# Patient Record
Sex: FEMALE | Race: WHITE | NOT HISPANIC OR LATINO | Employment: OTHER | URBAN - METROPOLITAN AREA
[De-identification: names, ages, dates, MRNs, and addresses within clinical notes are randomized per-mention and may not be internally consistent; named-entity substitution may affect disease eponyms.]

---

## 2017-05-08 ENCOUNTER — HOSPITAL ENCOUNTER (INPATIENT)
Facility: REHABILITATION | Age: 67
End: 2017-05-08
Attending: PHYSICAL MEDICINE & REHABILITATION | Admitting: PHYSICAL MEDICINE & REHABILITATION
Payer: MEDICARE

## 2019-12-21 ENCOUNTER — OFFICE VISIT (OUTPATIENT)
Dept: URGENT CARE | Facility: PHYSICIAN GROUP | Age: 69
End: 2019-12-21
Payer: MEDICARE

## 2019-12-21 ENCOUNTER — HOSPITAL ENCOUNTER (OUTPATIENT)
Dept: RADIOLOGY | Facility: MEDICAL CENTER | Age: 69
End: 2019-12-21
Attending: NURSE PRACTITIONER
Payer: MEDICARE

## 2019-12-21 VITALS
RESPIRATION RATE: 15 BRPM | DIASTOLIC BLOOD PRESSURE: 60 MMHG | TEMPERATURE: 97.8 F | BODY MASS INDEX: 27.31 KG/M2 | SYSTOLIC BLOOD PRESSURE: 102 MMHG | HEIGHT: 64 IN | OXYGEN SATURATION: 97 % | WEIGHT: 160 LBS | HEART RATE: 63 BPM

## 2019-12-21 DIAGNOSIS — R05.9 COUGH: ICD-10-CM

## 2019-12-21 DIAGNOSIS — R06.02 SOB (SHORTNESS OF BREATH): ICD-10-CM

## 2019-12-21 DIAGNOSIS — R09.1 PLEURISY: ICD-10-CM

## 2019-12-21 PROCEDURE — 99204 OFFICE O/P NEW MOD 45 MIN: CPT | Performed by: NURSE PRACTITIONER

## 2019-12-21 PROCEDURE — 71046 X-RAY EXAM CHEST 2 VIEWS: CPT

## 2019-12-21 ASSESSMENT — ENCOUNTER SYMPTOMS
FEVER: 0
COUGH: 1
NEUROLOGICAL NEGATIVE: 1
CARDIOVASCULAR NEGATIVE: 1
SHORTNESS OF BREATH: 1
CONSTITUTIONAL NEGATIVE: 1
CHILLS: 0
PALPITATIONS: 0

## 2019-12-22 NOTE — PROGRESS NOTES
Subjective:     Lilian Rod is a 69 y.o. female who presents for Cough (cold since thanksgiving, )       Cough   This is a new problem. The problem has been gradually worsening. Associated symptoms include shortness of breath. Pertinent negatives include no chills or fever.      Patient reports that about a week prior to Thanksgiving she developed a cough and cold.  Reports the cough has just started to improve.  However, reports concern of pain at her upper chest as well as her left middle back region.  Taking in a deep breath worsens the pain.  The pain improves immediately afterwards.  Prior therapy: NyQuil which helped.  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  She is concerned they may be related to that.  Reports that cold, dry air causes burning sensation in her lungs.  Has been boiling water on stove to try to help humidify the air in her home.    PMH:  has a past medical history of Anemia, Anesthesia, Arthritis, Backpain, Bronchitis, Colitis, Dental disorder, Fall, Heart burn, Hypertension, Hypothyroid, Indigestion, Infectious disease, Joint replacement, Pain (09-02-11), Pneumonia (1970's), Psychiatric problem, Renal disorder, Snoring, Unspecified hemorrhagic conditions, and Unspecified urinary incontinence.    MEDS:   Current Outpatient Medications:   •  hydrocodone/acetaminophen (NORCO)  MG TABS, Take 0.5-1 Tabs by mouth every four hours as needed ((Pain Scale 4-6))., Disp: 60 Tab, Rfl: 0  •  hydrocodone/acetaminophen (NORCO)  MG TABS, Take 1 Tab by mouth every 6 hours as needed for Mild Pain., Disp: 25 Each, Rfl: 0  •  amitriptyline (ELAVIL) 10 MG TABS, Take 40 mg by mouth every evening., Disp: , Rfl:   •  amlodipine (NORVASC) 5 MG TABS, Take 5 mg by mouth every day., Disp: , Rfl:   •  celecoxib (CELEBREX) 200 MG CAPS, Take 200 mg by mouth 2 times a day., Disp: , Rfl:   •  duloxetine (CYMBALTA) 60 MG CPEP, Take 60 mg by mouth every evening., Disp: , Rfl:   •   ferrous gluconate (FERGON) 324 (38 FE) MG TABS, Take 324 mg by mouth 2 times a day, with meals., Disp: , Rfl:   •  levothyroxine (SYNTHROID) 100 MCG TABS, Take 88 mcg by mouth every morning., Disp: , Rfl:   •  lisinopril (PRINIVIL) 20 MG TABS, Take 40 mg by mouth every morning., Disp: , Rfl:   •  hydrocodone-acetaminophen (NORCO) 5-325 MG TABS per tablet, Take 1-2 Tabs by mouth every 6 hours as needed., Disp: , Rfl:   •  diazepam (VALIUM) 5 MG TABS, Take 5-10 mg by mouth at bedtime as needed., Disp: , Rfl:   •  calcium carbonate (TUMS) 500 MG CHEW, Take 1 Tab by mouth 2 times a day., Disp: , Rfl:   •  hydroxyzine (VISTARIL) 50 MG CAPS, Take 1 Cap by mouth 3 times a day as needed for Itching (nausea)., Disp: 25 Cap, Rfl: 0  •  ascorbic acid (ASCORBIC ACID) 500 MG TABS, Take 500 mg by mouth every day. Take with meals, Disp: , Rfl:     ALLERGIES:   Allergies   Allergen Reactions   • Sulfa Drugs Itching     itch   • Tape      Rips skin off   • Zofran Itching   • Morphine      Unsure of reaction     SURGHX:   Past Surgical History:   Procedure Laterality Date   • ANKLE ORIF  11/8/2013    Performed by Burak Lujan M.D. at SUR[facility]ERY [facility]   • LUMBAR FUSION POSTERIOR  9/8/2011    Performed by OSMAN HOLLEY at SUR[facility]ERY [facility]   • LUMBAR DECOMPRESSION  9/8/2011    Performed by OSMAN HOLLEY at [facility]   • CERVICAL DISK AND FUSION ANTERIOR  6/2/2011    Performed by OSMAN HOLLEY at [facility]   • HARDWARE REMOVAL NEURO  6/2/2011    Performed by OSMAN HOLLEY at [facility]   • CORPECTOMY  6/2/2011    Performed by OSMAN HOLLEY at [facility]   • KNEE ARTHROPLASTY TOTAL  6/5/08    Performed by NIYAH FAULKNER at [facility]   • OTHER ORTHOPEDIC SURGERY  2007    right thumb and carpal tunnel   • OTHER NEUROLOGICAL SURG  2000    neck   • OTHER  2000    left foot repair   • OTHER ORTHOPEDIC SURGERY  1990    carpal tunnel bilateral   •  "GYN SURGERY  1980    hysterectomy   • CHOLECYSTECTOMY     • KNEE ARTHROTOMY      right   • OTHER      5-6 back surgeries prior to 1991   • OTHER      right thumb   • OTHER      jaw   • OTHER NEUROLOGICAL SURG  1980s    L4-5     SOCHX:  reports that she quit smoking about 24 years ago. Her smoking use included cigarettes. She has a 25.00 pack-year smoking history. She has never used smokeless tobacco. She reports current alcohol use. She reports that she does not use drugs.     FH: Reviewed with patient, not pertinent to this visit.    Review of Systems   Constitutional: Negative.  Negative for chills, fever and malaise/fatigue.   HENT: Negative.    Respiratory: Positive for cough and shortness of breath.    Cardiovascular: Negative.  Negative for palpitations.   Neurological: Negative.    All other systems reviewed and are negative.    Objective:     /60   Pulse 63   Temp 36.6 °C (97.8 °F)   Resp 15   Ht 1.626 m (5' 4\")   Wt 72.6 kg (160 lb)   SpO2 97%   BMI 27.46 kg/m²     Physical Exam  Vitals signs reviewed.   Constitutional:       General: She is not in acute distress.     Appearance: She is well-developed. She is not ill-appearing, toxic-appearing or diaphoretic.   HENT:      Head: Normocephalic.      Right Ear: External ear normal.      Left Ear: External ear normal.      Nose: Nose normal.   Eyes:      Extraocular Movements: Extraocular movements intact.      Conjunctiva/sclera: Conjunctivae normal.   Neck:      Musculoskeletal: Normal range of motion.   Cardiovascular:      Rate and Rhythm: Normal rate and regular rhythm.      Pulses: Normal pulses.      Heart sounds: Normal heart sounds.   Pulmonary:      Effort: Pulmonary effort is normal. No respiratory distress.      Breath sounds: Normal breath sounds. No decreased breath sounds, wheezing, rhonchi or rales.   Abdominal:      General: Bowel sounds are normal.   Musculoskeletal: Normal range of motion.         General: No deformity.      " Thoracic back: Normal.   Skin:     General: Skin is warm and dry.      Capillary Refill: Capillary refill takes less than 2 seconds.      Coloration: Skin is not pale.      Findings: No erythema or rash.   Neurological:      Mental Status: She is alert and oriented to person, place, and time.      Sensory: No sensory deficit.      Coordination: Coordination normal.   Psychiatric:         Behavior: Behavior normal. Behavior is cooperative.       Chest x-ray:    Details     Reading Physician Reading Date Result Priority   Jami Tobar M.D. 12/21/2019       Narrative & Impression        12/21/2019 5:42 PM     HISTORY/REASON FOR EXAM:  Shortness of Breath     TECHNIQUE/EXAM DESCRIPTION AND NUMBER OF VIEWS:  Two views of the chest.     COMPARISON:  9/19/2011     FINDINGS:     The cardiac silhouette  and mediastinal contours are normal.     No discrete opacity, pleural fluid or pneumothorax.     There are postoperative changes in the thoracolumbar spine with spinal curvature. Suture anchors are in the right humeral head.     Multiple surgical clips project over the upper abdomen.     IMPRESSION:     No acute cardiopulmonary findings.             Last Resulted: 12/21/19  5:53 PM           Assessment/Plan:     1. SOB (shortness of breath)  - DX-CHEST-2 VIEWS; Future    2. Cough    3. Pleurisy    X-ray of chest ordered. Radiology report and images reviewed by myself. No acute cardiopulmonary process. Concur with findings.    Discussed close monitoring and supportive measures including increasing fluids and rest.  Continue with keeping area humidified.    Vital signs stable, afebrile, asymptomatic, no acute distress.    Warning signs reviewed. Strict return/ER precautions advised.    Patient advised to: Return for 1) Symptoms don't improve or worsen, or go to ER, 2) Follow up with primary care in 7-10 days.    Differential diagnosis, natural history, supportive care, and indications for immediate follow-up discussed.  All questions answered. Patient agrees with the plan of care.

## 2021-01-15 DIAGNOSIS — Z23 NEED FOR VACCINATION: ICD-10-CM

## 2023-03-13 ENCOUNTER — TELEPHONE (OUTPATIENT)
Dept: ONCOLOGY | Facility: MEDICAL CENTER | Age: 73
End: 2023-03-13
Payer: MEDICARE

## 2023-03-13 NOTE — TELEPHONE ENCOUNTER
Called [phone], unable to speak to rep, lvm to fax iron infusion orders if Dr. Ayala wants patient to receive this treatment, provided fax number and phone number for [facility].

## 2023-03-29 ENCOUNTER — TELEPHONE (OUTPATIENT)
Dept: ONCOLOGY | Facility: MEDICAL CENTER | Age: 73
End: 2023-03-29
Payer: MEDICARE

## 2023-03-29 NOTE — TELEPHONE ENCOUNTER
louisam for Dr. Melo; we received orders for iron dextran, this drug is on a nationwide shortage we have none in stock. We have an alternative drug called Feraheme that we could use if we received updated orders for that drug instead, then we can move forward with scheduling this patient

## 2023-04-03 RX ORDER — 0.9 % SODIUM CHLORIDE 0.9 %
10 VIAL (ML) INJECTION PRN
Status: CANCELLED | OUTPATIENT
Start: 2023-04-04

## 2023-04-03 RX ORDER — SODIUM CHLORIDE 9 MG/ML
INJECTION, SOLUTION INTRAVENOUS CONTINUOUS
Status: CANCELLED | OUTPATIENT
Start: 2023-04-04

## 2023-04-03 RX ORDER — HEPARIN SODIUM (PORCINE) LOCK FLUSH IV SOLN 100 UNIT/ML 100 UNIT/ML
500 SOLUTION INTRAVENOUS PRN
Status: CANCELLED | OUTPATIENT
Start: 2023-04-04

## 2023-04-03 RX ORDER — 0.9 % SODIUM CHLORIDE 0.9 %
3 VIAL (ML) INJECTION PRN
Status: CANCELLED | OUTPATIENT
Start: 2023-04-04

## 2023-04-03 RX ORDER — 0.9 % SODIUM CHLORIDE 0.9 %
VIAL (ML) INJECTION PRN
Status: CANCELLED | OUTPATIENT
Start: 2023-04-04

## 2023-04-14 ENCOUNTER — TELEPHONE (OUTPATIENT)
Dept: ONCOLOGY | Facility: MEDICAL CENTER | Age: 73
End: 2023-04-14
Payer: MEDICARE

## 2023-04-14 ENCOUNTER — OUTPATIENT INFUSION SERVICES (OUTPATIENT)
Dept: ONCOLOGY | Facility: MEDICAL CENTER | Age: 73
End: 2023-04-14
Attending: INTERNAL MEDICINE
Payer: MEDICARE

## 2023-04-14 VITALS
OXYGEN SATURATION: 96 % | HEART RATE: 77 BPM | SYSTOLIC BLOOD PRESSURE: 173 MMHG | BODY MASS INDEX: 31.78 KG/M2 | WEIGHT: 157.63 LBS | TEMPERATURE: 97 F | DIASTOLIC BLOOD PRESSURE: 81 MMHG | RESPIRATION RATE: 18 BRPM | HEIGHT: 59 IN

## 2023-04-14 DIAGNOSIS — D50.9 IRON DEFICIENCY ANEMIA, UNSPECIFIED IRON DEFICIENCY ANEMIA TYPE: ICD-10-CM

## 2023-04-14 LAB
FERRITIN SERPL-MCNC: 10 NG/ML (ref 10–291)
IRON SATN MFR SERPL: 4 % (ref 15–55)
IRON SERPL-MCNC: 18 UG/DL (ref 40–170)
TIBC SERPL-MCNC: 404 UG/DL (ref 250–450)
UIBC SERPL-MCNC: 386 UG/DL (ref 110–370)

## 2023-04-14 PROCEDURE — 700105 HCHG RX REV CODE 258: Performed by: INTERNAL MEDICINE

## 2023-04-14 PROCEDURE — 82728 ASSAY OF FERRITIN: CPT

## 2023-04-14 PROCEDURE — 700111 HCHG RX REV CODE 636 W/ 250 OVERRIDE (IP): Mod: JG | Performed by: INTERNAL MEDICINE

## 2023-04-14 PROCEDURE — 83550 IRON BINDING TEST: CPT

## 2023-04-14 PROCEDURE — 83540 ASSAY OF IRON: CPT

## 2023-04-14 PROCEDURE — 96365 THER/PROPH/DIAG IV INF INIT: CPT

## 2023-04-14 RX ORDER — 0.9 % SODIUM CHLORIDE 0.9 %
10 VIAL (ML) INJECTION PRN
OUTPATIENT
Start: 2023-04-21

## 2023-04-14 RX ORDER — LISINOPRIL 40 MG/1
TABLET ORAL
COMMUNITY

## 2023-04-14 RX ORDER — HEPARIN SODIUM (PORCINE) LOCK FLUSH IV SOLN 100 UNIT/ML 100 UNIT/ML
500 SOLUTION INTRAVENOUS PRN
OUTPATIENT
Start: 2023-04-21

## 2023-04-14 RX ORDER — DIPHENOXYLATE HYDROCHLORIDE AND ATROPINE SULFATE 2.5; .025 MG/1; MG/1
TABLET ORAL
COMMUNITY

## 2023-04-14 RX ORDER — SODIUM CHLORIDE 9 MG/ML
INJECTION, SOLUTION INTRAVENOUS CONTINUOUS
OUTPATIENT
Start: 2023-04-21

## 2023-04-14 RX ORDER — 0.9 % SODIUM CHLORIDE 0.9 %
VIAL (ML) INJECTION PRN
OUTPATIENT
Start: 2023-04-21

## 2023-04-14 RX ORDER — SOLIFENACIN SUCCINATE 5 MG/1
TABLET, FILM COATED ORAL
COMMUNITY

## 2023-04-14 RX ORDER — 0.9 % SODIUM CHLORIDE 0.9 %
3 VIAL (ML) INJECTION PRN
OUTPATIENT
Start: 2023-04-21

## 2023-04-14 RX ADMIN — FERUMOXYTOL 510 MG: 510 INJECTION INTRAVENOUS at 17:09

## 2023-04-14 ASSESSMENT — PAIN DESCRIPTION - PAIN TYPE: TYPE: CHRONIC PAIN

## 2023-04-15 NOTE — PROGRESS NOTES
Lilian arrives for Iron Feraheme infusion. Pt oriented to infusion services and routine. PIV started, labs drawn per protocool- CBC and iron studies. Lab results reviewed by RN & pharmacist. Iron Feraheme infused over 30 minutes with 30 minute observation time. Pt tolerated well. No signs or symptoms of adverse reaction observed or expressed. PIV removed with tip intact; gauze and coban applied.  Pt left ambulating with FWW assistance and in no apparent distress.  Pt has next appointment.

## 2023-04-22 ENCOUNTER — TELEPHONE (OUTPATIENT)
Dept: ONCOLOGY | Facility: MEDICAL CENTER | Age: 73
End: 2023-04-22
Payer: MEDICARE

## 2023-05-06 ENCOUNTER — APPOINTMENT (OUTPATIENT)
Dept: ONCOLOGY | Facility: MEDICAL CENTER | Age: 73
End: 2023-05-06
Attending: INTERNAL MEDICINE
Payer: MEDICARE